# Patient Record
Sex: FEMALE | Race: WHITE | URBAN - METROPOLITAN AREA
[De-identification: names, ages, dates, MRNs, and addresses within clinical notes are randomized per-mention and may not be internally consistent; named-entity substitution may affect disease eponyms.]

---

## 2022-06-10 ENCOUNTER — OFFICE VISIT (OUTPATIENT)
Dept: SURGERY | Age: 77
End: 2022-06-10
Payer: MEDICARE

## 2022-06-10 VITALS
BODY MASS INDEX: 36 KG/M2 | WEIGHT: 224 LBS | DIASTOLIC BLOOD PRESSURE: 71 MMHG | HEIGHT: 66 IN | OXYGEN SATURATION: 98 % | HEART RATE: 100 BPM | SYSTOLIC BLOOD PRESSURE: 131 MMHG | TEMPERATURE: 98.6 F

## 2022-06-10 DIAGNOSIS — Z85.3 HISTORY OF LEFT BREAST CANCER: ICD-10-CM

## 2022-06-10 DIAGNOSIS — Z12.31 BREAST CANCER SCREENING BY MAMMOGRAM: Primary | ICD-10-CM

## 2022-06-10 DIAGNOSIS — Z90.12 HISTORY OF MASTECTOMY, LEFT: ICD-10-CM

## 2022-06-10 PROCEDURE — 1090F PRES/ABSN URINE INCON ASSESS: CPT | Performed by: SURGERY

## 2022-06-10 PROCEDURE — G8432 DEP SCR NOT DOC, RNG: HCPCS | Performed by: SURGERY

## 2022-06-10 PROCEDURE — G8427 DOCREV CUR MEDS BY ELIG CLIN: HCPCS | Performed by: SURGERY

## 2022-06-10 PROCEDURE — G8419 CALC BMI OUT NRM PARAM NOF/U: HCPCS | Performed by: SURGERY

## 2022-06-10 PROCEDURE — 1123F ACP DISCUSS/DSCN MKR DOCD: CPT | Performed by: SURGERY

## 2022-06-10 PROCEDURE — G8399 PT W/DXA RESULTS DOCUMENT: HCPCS | Performed by: SURGERY

## 2022-06-10 PROCEDURE — 3017F COLORECTAL CA SCREEN DOC REV: CPT | Performed by: SURGERY

## 2022-06-10 PROCEDURE — 1101F PT FALLS ASSESS-DOCD LE1/YR: CPT | Performed by: SURGERY

## 2022-06-10 PROCEDURE — 99204 OFFICE O/P NEW MOD 45 MIN: CPT | Performed by: SURGERY

## 2022-06-10 PROCEDURE — G8536 NO DOC ELDER MAL SCRN: HCPCS | Performed by: SURGERY

## 2022-06-10 NOTE — PROGRESS NOTES
Review of Systems   Constitutional: Negative. HENT: Negative. Eyes: Negative. Respiratory: Negative. Cardiovascular: Negative. Musculoskeletal: Positive for back pain. Skin: Negative. Neurological: Negative. Endo/Heme/Allergies: Negative. Psychiatric/Behavioral: Negative.

## 2022-06-10 NOTE — PROGRESS NOTES
[facility]  General Surgery    Subjective:      Chief complaint: History of left breast cancer status post left breast nipple sparing mastectomy with bilateral breast augmentation  HPI: Patient is a very pleasant morbidly obese-BMI 36.15 kg/m² 78-year-old female with past medical history remarkable for left breast cancer left breast and arm lymphedema, colon polyps, asthma and back pain. Patient is referred by Dr. Nakul Conrad for establishment of breast care. Her most recent mammogram was performed on 2022 which is a BI-RADS 2 with bilateral silicone retroglandular implants in place. Patient endorses performing self breast exam at times but not routinely. She denies any unintentional weight loss or breast pain or nipple drainage or discharge or skin changes of the breast.  She denies any masses palpable on self breast exam.  Patient was 16years old at menarche. She was 22years old at first live birth. She was in her 46s with menopause. She denies taking hormone replacement therapy. Her father was diagnosed with prostate cancer in his late 62s and  at age 78. Father also had lung cancer. There are no problems to display for this patient.     Past Medical History:   Diagnosis Date    Anal fissure     Asthma     Back pain     Cancer ([address]     left breast cancer    Hx of colonic polyps     Lymphedema     left breast and left arm      Past Surgical History:   Procedure Laterality Date    HX BREAST LUMPECTOMY      left    HX COLONOSCOPY      10/21/11    HX HEMORRHOIDECTOMY      HX OTHER SURGICAL      cyst removal from right foot    HX OTHER SURGICAL      breast prosthesis on both breasts    HX TONSIL AND ADENOIDECTOMY      HX TUBAL LIGATION      IR INSERT PICC W PORT OVER 5 YEARS        Family History   Problem Relation Age of Onset    Prostate Cancer Father     Stomach Cancer Paternal Grandmother       Social History     Tobacco Use    Smoking status: Never Smoker    Smokeless tobacco: Never Used   Substance Use Topics    Alcohol use: Yes     Alcohol/week: 0.0 standard drinks     Comment: wine occas      Allergies   Allergen Reactions    Sulfur Other (comments)     As a child, pt unknown to reaction       Prior to Admission medications    Medication Sig Start Date End Date Taking? Authorizing Provider   acyclovir (ZOVIRAX) 400 mg tablet Take 400 mg by mouth two (2) times a day. Yes Provider, Historical   pantoprazole (PROTONIX) 40 mg tablet Take 40 mg by mouth daily. Yes Provider, Historical   fluticasone (FLOVENT DISKUS) 50 mcg/actuation inhaler Take  by inhalation daily as needed. Yes Provider, Historical   ALBUTEROL SULFATE (VENTOLIN HFA IN) Take  by inhalation as needed. Yes Provider, Historical   LOTEPREDNOL ETABONATE (LOTEMAX OP) Apply 1 Drop to eye two (2) times a day. Right eye   Yes Provider, Historical   CARBOXYMETHYLCELLULOSE SODIUM (REFRESH TEARS OP) Apply  to eye two (2) times daily as needed. Yes Provider, Historical   BEPOTASTINE BESILATE (BEPREVE OP) Apply  to eye two (2) times daily as needed. Yes Provider, Historical   aspirin delayed-release 81 mg tablet Take  by mouth daily. Yes Provider, Historical   cholecalciferol (VITAMIN D3) 1,000 unit tablet Take  by mouth daily. Yes Provider, Historical   CRANBERRY CONC/ASCORBIC ACID (CRANBERRY PLUS VITAMIN C PO) Take  by mouth two (2) times a day. Yes Provider, Historical   MULTIVIT-MIN/FA/CA CARB/VIT K (ONE-A-DAY WOMEN'S 50+ PO) Take  by mouth daily. Yes Provider, Historical   bisacodyl (DULCOLAX, BISACODYL,) 5 mg EC tablet Take both tablets as directed for bowel prep 9/30/15  Yes Clara Cain MD   VITAMIN B COMPLEX PO Take  by mouth daily. Provider, Historical       Review of Systems:    14 systems were reviewed. The results are as above in the HPI and otherwise negative.      Objective:       Physical Exam:  GENERAL: alert, cooperative, no distress, appears stated age, EYE: conjunctivae/corneas clear. PERRL, EOM's intact. Fundi benign,  THROAT & NECK: normal and no erythema or exudates noted. ,    LYMPHATIC: Cervical, supraclavicular, and axillary nodes normal. ,   LUNG: clear to auscultation bilaterally,   HEART: regular rate and rhythm, S1, S2 normal, no murmur, click, rub or gallop  BREAST: Bra size D 38. The breast are symmetrical with the right breast slightly larger than the left. .  No dimpling, retractions, skin changes or nipple retractions are visible. No axillary of supraclavicular lymphadenopathy bilaterally. No nipple retraction or discharge bilaterally. No breast masses bilaterally. ABDOMEN: soft, non-tender. Bowel sounds normal. No masses,  no organomegaly,  EXTREMITIES:  extremities normal, atraumatic, no cyanosis or edema,   SKIN: Normal.,   NEUROLOGIC: AOx3. Gait normal. Reflexes and motor strength normal and symmetric. Cranial nerves 2-12 and sensation grossly intact. ,     Data Review: Mammography     Ms. Justin Gandhi has a reminder for a \"due or due soon\" health maintenance. I have asked that she contact her primary care provider for follow-up on this health maintenance. Impression:     · Patient with history of left breast cancer status postmastectomy with immediate reconstruction by Dr. Lynnette Pierre in 2002 last seen in 2019. Plan:     · Continue monthly self breast exam  · Right breast 3D screening mammography annually with the next mammogram due March 19, 2023  · Clinical breast exam in 1 year  · Please call or visit with any questions or concerns.   ·     Signed By: Marcia Tanner MD     Elisabeth 10, 2022

## 2022-06-21 NOTE — PATIENT INSTRUCTIONS
